# Patient Record
Sex: MALE | Race: WHITE | NOT HISPANIC OR LATINO | Employment: FULL TIME | URBAN - NONMETROPOLITAN AREA
[De-identification: names, ages, dates, MRNs, and addresses within clinical notes are randomized per-mention and may not be internally consistent; named-entity substitution may affect disease eponyms.]

---

## 2017-08-14 ENCOUNTER — ANESTHESIA EVENT (OUTPATIENT)
Dept: GASTROENTEROLOGY | Facility: HOSPITAL | Age: 52
End: 2017-08-14

## 2017-08-14 ENCOUNTER — HOSPITAL ENCOUNTER (OUTPATIENT)
Facility: HOSPITAL | Age: 52
Setting detail: HOSPITAL OUTPATIENT SURGERY
Discharge: HOME | End: 2017-08-14
Attending: INTERNAL MEDICINE | Admitting: INTERNAL MEDICINE

## 2017-08-14 ENCOUNTER — ANESTHESIA (OUTPATIENT)
Dept: GASTROENTEROLOGY | Facility: HOSPITAL | Age: 52
End: 2017-08-14

## 2017-08-14 VITALS
BODY MASS INDEX: 31.74 KG/M2 | HEIGHT: 68 IN | SYSTOLIC BLOOD PRESSURE: 129 MMHG | WEIGHT: 209.44 LBS | TEMPERATURE: 97.6 F | RESPIRATION RATE: 16 BRPM | OXYGEN SATURATION: 98 % | HEART RATE: 67 BPM | DIASTOLIC BLOOD PRESSURE: 59 MMHG

## 2017-08-14 DIAGNOSIS — Z12.11 SPECIAL SCREENING FOR MALIGNANT NEOPLASMS, COLON: ICD-10-CM

## 2017-08-14 PROCEDURE — 88305 TISSUE EXAM BY PATHOLOGIST: CPT | Performed by: PATHOLOGY

## 2017-08-14 PROCEDURE — 25010000002 PROPOFOL 10 MG/ML EMULSION: Performed by: NURSE ANESTHETIST, CERTIFIED REGISTERED

## 2017-08-14 PROCEDURE — 88305 TISSUE EXAM BY PATHOLOGIST: CPT | Performed by: INTERNAL MEDICINE

## 2017-08-14 RX ORDER — LIDOCAINE HYDROCHLORIDE 10 MG/ML
INJECTION, SOLUTION INFILTRATION; PERINEURAL AS NEEDED
Status: DISCONTINUED | OUTPATIENT
Start: 2017-08-14 | End: 2017-08-14 | Stop reason: SURG

## 2017-08-14 RX ORDER — PROPOFOL 10 MG/ML
VIAL (ML) INTRAVENOUS AS NEEDED
Status: DISCONTINUED | OUTPATIENT
Start: 2017-08-14 | End: 2017-08-14 | Stop reason: SURG

## 2017-08-14 RX ORDER — DEXTROSE AND SODIUM CHLORIDE 5; .45 G/100ML; G/100ML
20 INJECTION, SOLUTION INTRAVENOUS CONTINUOUS
Status: DISCONTINUED | OUTPATIENT
Start: 2017-08-14 | End: 2017-08-14 | Stop reason: HOSPADM

## 2017-08-14 RX ADMIN — DEXTROSE AND SODIUM CHLORIDE 20 ML/HR: 5; 450 INJECTION, SOLUTION INTRAVENOUS at 14:32

## 2017-08-14 RX ADMIN — LIDOCAINE HYDROCHLORIDE 50 MG: 10 INJECTION, SOLUTION INFILTRATION; PERINEURAL at 14:33

## 2017-08-14 RX ADMIN — PROPOFOL 70 MG: 10 INJECTION, EMULSION INTRAVENOUS at 14:33

## 2017-08-14 RX ADMIN — PROPOFOL 30 MG: 10 INJECTION, EMULSION INTRAVENOUS at 14:36

## 2017-08-14 NOTE — PLAN OF CARE
Problem: Patient Care Overview (Adult)  Goal: Plan of Care Review  Outcome: Ongoing (interventions implemented as appropriate)    08/14/17 1447   Coping/Psychosocial Response Interventions   Plan Of Care Reviewed With patient   Patient Care Overview   Progress no change   Outcome Evaluation   Outcome Summary/Follow up Plan vss         Problem: GI Endoscopy (Adult)  Goal: Signs and Symptoms of Listed Potential Problems Will be Absent or Manageable (GI Endoscopy)  Outcome: Ongoing (interventions implemented as appropriate)    08/14/17 1447   GI Endoscopy   Problems Assessed (GI Endoscopy) all   Problems Present (GI Endoscopy) none

## 2017-08-14 NOTE — H&P
Henry Kinsey DO,[facility]  Gastroenterology  Hepatology  Endoscopy  Board Certified in Internal Medicine and gastroenterology  [address], [address]  [place]. [address]  - [phone]   F - [phone]     GASTROENTEROLOGY HISTORY AND PHYSICAL  NOTE   HENRY KINSEY DO.         SUBJECTIVE:   8/14/2017    Name: Rivera Son  DOD: 1965        Chief Complaint:       Subjective : Screening examination for routine risk for colon cancer    Patient is 52 y.o. male presents with desire for elective colonoscopy.  No subjective or objective symptoms or family history of colon cancer.      ROS/HISTORY/ CURRENT MEDICATIONS/OBJECTIVE/VS/PE:   Review of Systems:   Review of Systems    History:     Past Medical History:   Diagnosis Date   • High cholesterol      Past Surgical History:   Procedure Laterality Date   • FOOT FUSION Right     2nd toe    • NASAL SEPTUM SURGERY  08/12/2013    Nasal septoplasty   • ORIF FINGER / THUMB FRACTURE Left      History reviewed. No pertinent family history.  Social History   Substance Use Topics   • Smoking status: Never Smoker   • Smokeless tobacco: Former User   • Alcohol use No     No prescriptions prior to admission.     Allergies:  Review of patient's allergies indicates no known allergies.    I have reviewed the patients medical history, surgical history and family history in the available medical record system.     Current Medications:     Current Facility-Administered Medications   Medication Dose Route Frequency Provider Last Rate Last Dose   • dextrose 5 % and sodium chloride 0.45 % infusion  20 mL/hr Intravenous Continuous Henry Kinsey DO           Objective     Physical Exam:        Physical Exam:  General Appearance:    Alert, cooperative, in no acute distress   Head:    Normocephalic, without obvious abnormality, atraumatic   Eyes:            Lids and lashes normal, conjunctivae and sclerae normal, no   icterus, no pallor, corneas clear, PERRLA    Ears:    Ears appear intact with no abnormalities noted   Throat:   No oral lesions, no thrush, oral mucosa moist   Neck:   No adenopathy, supple, trachea midline, no thyromegaly, no     carotid bruit, no JVD   Back:     No kyphosis present, no scoliosis present, no skin lesions,       erythema or scars, no tenderness to percussion or                   palpation,   range of motion normal   Lungs:     Clear to auscultation,respirations regular, even and                   unlabored    Heart:    Regular rhythm and normal rate, normal S1 and S2, no            murmur, no gallop, no rub, no click   Breast Exam:    Deferred   Abdomen:     Normal bowel sounds, no masses, no organomegaly, soft        non-tender, non-distended, no guarding, no rebound                 tenderness   Genitalia:    Deferred   Extremities:   Moves all extremities well, no edema, no cyanosis, no              redness   Pulses:   Pulses palpable and equal bilaterally   Skin:   No bleeding, bruising or rash   Lymph nodes:   No palpable adenopathy   Neurologic:   Cranial nerves 2 - 12 grossly intact, sensation intact, DTR        present and equal bilaterally      Results Review:     No results found for: WBC, HGB, HCT, PLT          No results found for: LIPASE  No results found for: INR       Radiology Review:  Imaging Results (last 72 hours)     ** No results found for the last 72 hours. **           I reviewed the patient's new clinical results.  I reviewed the patient's new imaging results and agree with the interpretation.     ASSESSMENT/PLAN:   ASSESSMENT:   1.  Screening examination for routine risk for colon cancer    PLAN:   1.  Colonoscopy, screening    Risk and benefits associated with the procedure are reviewed with the patient.  The patient wishes to proceed      Lewis Garcia DO  08/14/17  2:25 PM

## 2017-08-14 NOTE — PLAN OF CARE
Problem: Patient Care Overview (Adult)  Goal: Plan of Care Review    08/14/17 1501   Coping/Psychosocial Response Interventions   Plan Of Care Reviewed With patient   Patient Care Overview   Progress no change   Outcome Evaluation   Outcome Summary/Follow up Plan vss         Problem: GI Endoscopy (Adult)  Goal: Signs and Symptoms of Listed Potential Problems Will be Absent or Manageable (GI Endoscopy)    08/14/17 1501   GI Endoscopy   Problems Assessed (GI Endoscopy) all   Problems Present (GI Endoscopy) none

## 2017-08-14 NOTE — ANESTHESIA POSTPROCEDURE EVALUATION
Patient: Rivera Son    Procedure Summary     Date Anesthesia Start Anesthesia Stop Room / Location    08/14/17 1432 1446 [facility] ENDOSCOPY 2 / [facility] ENDOSCOPY       Procedure Diagnosis Surgeon Provider    COLONOSCOPY (N/A ) Special screening for malignant neoplasms, colon  (Special screening for malignant neoplasms, colon [Z12.11]) DO Rayo Alvarado CRNA          Anesthesia Type: MAC  Last vitals  BP   155/82 (08/14/17 1417)    Temp   97.3 °F (36.3 °C) (08/14/17 1417)    Pulse   60 (08/14/17 1417)   Resp   18 (08/14/17 1417)    SpO2   100 % (08/14/17 1417)      Post Anesthesia Care and Evaluation    Patient location during evaluation: bedside  Patient participation: complete - patient participated  Level of consciousness: awake and alert  Pain score: 0  Pain management: adequate  Airway patency: patent  Anesthetic complications: No anesthetic complications  PONV Status: none  Cardiovascular status: acceptable  Respiratory status: acceptable  Hydration status: acceptable

## 2017-08-14 NOTE — ANESTHESIA PREPROCEDURE EVALUATION
Anesthesia Evaluation     Patient summary reviewed and Nursing notes reviewed   NPO Solid Status: > 8 hours  NPO Liquid Status: > 2 hours     Airway   Mallampati: II  TM distance: >3 FB  Neck ROM: full  no difficulty expected  Dental - normal exam     Pulmonary - negative pulmonary ROS and normal exam   Cardiovascular     Rhythm: regular  Rate: normal        Neuro/Psych- negative ROS  GI/Hepatic/Renal/Endo - negative ROS     Musculoskeletal (-) negative ROS    Abdominal    Substance History - negative use     OB/GYN          Other - negative ROS                                       Anesthesia Plan    ASA 2     MAC     intravenous induction   Anesthetic plan and risks discussed with patient.    Plan discussed with CRNA.

## 2017-08-16 LAB
LAB AP CASE REPORT: NORMAL
Lab: NORMAL
PATH REPORT.FINAL DX SPEC: NORMAL
PATH REPORT.GROSS SPEC: NORMAL

## 2018-08-14 ENCOUNTER — CLINICAL SUPPORT (OUTPATIENT)
Dept: AUDIOLOGY | Facility: CLINIC | Age: 53
End: 2018-08-14

## 2018-08-14 DIAGNOSIS — Z46.1 ENCOUNTER FOR FITTING OR ADJUSTMENT OF HEARING AID: Primary | ICD-10-CM

## 2018-08-14 PROCEDURE — HEARINGNOCHG: Performed by: AUDIOLOGIST

## 2018-08-14 NOTE — PROGRESS NOTES
HEARING AID CHECK    Name:  Rivera Son  :  1965  Age:  53 y.o.  Date of Evaluation:  2018      HISTORY    Reason for visit:  Rivera Son is seen today for a hearing aid check.  Patient reports his left hearing aid is not working.    Hearing aid history:  Patient is currently wearing a  in the Canal (VERO) hearing aid in both ear(s).     OFFICE VISIT    During today's visit the left hearing aid was troubleshooted, and it was determined the external  was not working.  The external  was replaced and the hearing aid was in good working condition.  Ear domes were replaced on both hearing aids.  He purchased 1 package of wax traps at this time.     It was a pleasure seeing Rivera Son in Audiology today.  It is a pleasure helping Mr. Son with his amplification needs.             This document has been electronically signed by Renata Suggs MS CCC-JOY on 2018 1:43 PM       Renata Suggs MS CCC-JOY  Licensed Audiologist    For Billing and Codin  Hearing Aid Check, Binaural - no charge

## 2019-10-15 ENCOUNTER — CLINICAL SUPPORT (OUTPATIENT)
Dept: AUDIOLOGY | Facility: CLINIC | Age: 54
End: 2019-10-15

## 2019-10-15 DIAGNOSIS — H90.3 SENSORINEURAL HEARING LOSS, BILATERAL: Primary | ICD-10-CM

## 2019-10-15 PROCEDURE — 92567 TYMPANOMETRY: CPT | Performed by: AUDIOLOGIST

## 2019-10-15 PROCEDURE — 92557 COMPREHENSIVE HEARING TEST: CPT | Performed by: AUDIOLOGIST

## 2019-10-16 NOTE — PROGRESS NOTES
HEARING AID EVALUATION WITH AUDIOGRAM    Name:  Rivera Son  :  1965  Age:  54 y.o.  Date of Evaluation:  10/16/2019      HISTORY    Reason for visit:  Rivera Son is seen today for a hearing test and hearing aid evaluation at the request of himself .  Patient reports his insurance pays for his hearing aids every 3 years, and he is currently eligible for new hearing aids.  He states his hearing might be a little worse in his left ear.  He states he hears an occasional loud ringing in his left ear which has gotten worse in the last 1-2 years.  He states he does not always work in loud noise, but when he does he wears hearing protection.     He currently wears Resound VERO hearing aids, and he states the left hearing aid isn't working well, and the  doesn't fit in his ear well.    Hearing aid history:  Patient is currently wearing a  in the Canal (VERO) hearing aid in both ear(s).    EVALUATION    See Audiogram    RESULTS:    Otoscopy and Tympanometry 226 Hz :  Right Ear:  Otoscopy:  Clear ear canal          Tympanometry:  Middle ear function within normal limits    Left Ear:   Otoscopy:  Clear ear canal        Tympanometry:  Middle ear function within normal limits    Test technique:  Standard Audiometry     Pure Tone Audiometry:   Patient responded to pure tones at 10-65 dB for 250-8000 Hz in right ear, and at 10-75 dB for 250-8000 Hz in left ear.       Speech Audiometry:        Right Ear:  Speech Reception Threshold (SRT) was obtained at 5 dBHL                 Speech Discrimination scores were 100% in quiet when words were presented at 50 dBHL       Left Ear:  Speech Reception Threshold (SRT) was obtained at 15 dBHL                 Speech Discrimination scores were 100% in quiet when words were presented at 55 dBHL    Reliability:   good    IMPRESSIONS:  1.  Tympanometry results are consistent with Middle ear function within normal limits in both ears.  2.  Pure tone results  are consistent with within normal limits to moderately severe precipitous, high frequency sensorineural hearing loss for both ears.       RECOMMENDATIONS:  During the Hearing Aid discussion, Mr. Son has chosen 2  in the Canal (VERO) hearing aid(s) for both ears.  The hearing aid recommended is from [employer] with the VideoMiningo 7 62 digital circuit.  His insurance will pay up to $6,000.00 for hearing aids.  The cost of these hearing aids is $2,500.00 per aid for a total of $5,000.00 for the pair.  This amount will be billed to his insurance at the time of fitting.      In the meantime, his hearing aids were cleaned and checked.  The  was replaced on his left hearing aid using a 2 MP left  and medium open ear dome.  He reported the fit was good and the sound was better.  At the time, he declined purchasing the extended warranty on these hearing aids.  He was told that the hearing aids can still be sent off for repair out of warranty for $280 per repair.  He seemed to understand this.      Once his new hearing aids arrive, he will be contacted to make an appointment for his fitting.  It was a pleasure seeing Rivera Son in Audiology today.  I look forward to helping Mr. Son with his amplification needs.            This document has been electronically signed by Renata Suggs MS CCC-A on October 16, 2019 9:23 AM       Renata Suggs MS CCC-A  Licensed Audiologist

## 2019-11-11 ENCOUNTER — CLINICAL SUPPORT (OUTPATIENT)
Dept: AUDIOLOGY | Facility: CLINIC | Age: 54
End: 2019-11-11

## 2019-11-11 DIAGNOSIS — Z46.1 ENCOUNTER FOR FITTING OR ADJUSTMENT OF HEARING AID: Primary | ICD-10-CM

## 2019-11-11 PROCEDURE — V5261 HEARING AID, DIGIT, BIN, BTE: HCPCS | Performed by: AUDIOLOGIST

## 2019-11-11 NOTE — PROGRESS NOTES
HEARING AID FITTING    Name:  Rivera Son  :  1965  Age:  54 y.o.  Date of Evaluation:  2019      HISTORY    Reason for visit:  Rivera Son is seen today for a hearing aid fitting.  The hearing aids to be fit are  in the Canal (VERO) hearing aids from  [employer] with the Gigya 7 62 digital circuit.      Right Hearing Aid Serial Number: [id number]  Left Hearing Aid Serial Number: [id number]      Warranty Expiration:  's warranty extends through 2021 which covers repairs, loss and damage.    Battery Size:  13    The hearing aids were fit to Mr. Son's ears.  The hearing aids were fit using #2 MP receivers and medium open domes.  Patient reported the fit was comfortable and the sound was good.  Hearing aids were successfully paired to his iPhone.  Patient was instructed on use and care of the hearing instruments.  The necessary paperwork was signed.  All questions were answered at this time.  The cost of this hearing aid is $2,500.00 per aid for a total of $5,000.00.  Patient's insurance will be billed for the cost of the hearing aid(s).    Mr. Son will return in 1 week for a hearing aid follow up.  At that time we will make adjustments to the hearing aid(s) and address problems as necessary.      It was a pleasure seeing Rivera Son in Audiology today.  It is a pleasure helping Mr. Son with his amplification needs.             This document has been electronically signed by Renata Suggs MS CCC-A on 2019 1:39 PM        Renata Suggs MS CCC-A  Licensed Audiologist      For Billing and Coding:  Please bill the following to patient's [employer] insurance:    Hearing Aid, Digital Binaural BTE - $5,000.00

## 2019-11-20 ENCOUNTER — CLINICAL SUPPORT (OUTPATIENT)
Dept: AUDIOLOGY | Facility: CLINIC | Age: 54
End: 2019-11-20

## 2019-11-20 DIAGNOSIS — Z46.1 ENCOUNTER FOR FITTING OR ADJUSTMENT OF HEARING AID: Primary | ICD-10-CM

## 2019-11-20 PROCEDURE — HEARINGNOCHG: Performed by: AUDIOLOGIST

## 2019-11-20 NOTE — PROGRESS NOTES
HEARING AID CHECK    Name:  Rivera Son  :  1965  Age:  54 y.o.  Date of Evaluation:  2019      HISTORY    Reason for visit:  Rivera Son is seen today for a hearing aid check.  Patient reports he is doing well with his new [employer] Linx Quattro VERO hearing aids.  He reports he wears the hearing aids all day every day, he hears everything he needs to hear, and he is overall very satisfied with the hearing aids.    Hearing aid history:  Patient is currently wearing a  in the Canal (VERO) hearing aid in both ear(s).     OFFICE VISIT    During today's visit no adjustments were needed at this time.  He will return for hearing aid assistance as necessary.    It was a pleasure seeing Rivera Son in Audiology today.  It is a pleasure helping Mr. Son with his amplification needs.             This document has been electronically signed by Renata Suggs MS CCC-A on 2019 4:05 PM       Renata Suggs MS CCC-A  Licensed Audiologist    For Billing and Codin  Hearing Aid Check, Binaural - no charge

## 2020-03-26 PROCEDURE — U0003 INFECTIOUS AGENT DETECTION BY NUCLEIC ACID (DNA OR RNA); SEVERE ACUTE RESPIRATORY SYNDROME CORONAVIRUS 2 (SARS-COV-2) (CORONAVIRUS DISEASE [COVID-19]), AMPLIFIED PROBE TECHNIQUE, MAKING USE OF HIGH THROUGHPUT TECHNOLOGIES AS DESCRIBED BY CMS-2020-01-R: HCPCS | Performed by: NURSE PRACTITIONER

## 2020-03-26 PROCEDURE — 87635 SARS-COV-2 COVID-19 AMP PRB: CPT | Performed by: NURSE PRACTITIONER

## 2021-07-27 ENCOUNTER — CLINICAL SUPPORT (OUTPATIENT)
Dept: AUDIOLOGY | Facility: CLINIC | Age: 56
End: 2021-07-27

## 2021-07-27 DIAGNOSIS — Z46.1 ENCOUNTER FOR FITTING OR ADJUSTMENT OF HEARING AID: Primary | ICD-10-CM

## 2021-07-27 PROCEDURE — HEARINGNOCHG: Performed by: AUDIOLOGIST

## 2021-07-27 NOTE — PROGRESS NOTES
HEARING AID CHECK    Name:  Rivera Son  :  1965  Age:  56 y.o.  Date of Evaluation:  2021      HISTORY    Reason for visit:  Rivera Son is seen today for a hearing aid problem.  Patient reports his left hearing aid quit working.  He also reported any wind noise he hears is loud in his hearing aids.    Hearing aid history:  Patient is currently wearing a  in the Canal (VERO) hearing aid in both ears ear(s).     OFFICE VISIT    During today's visit troubleshooting revealed his  was not working.  Therefore, a new  (# 2 MP for the left ear) will be ordered.  Once the  arrives, the hearing aid can be fixed under warranty.  Then, the patient will be contacted to come  the aid, and there will be no charge.    The computer was used to decrease loudness of wind noise.  He will try this and let me know if he needs any more adjustments.     It was a pleasure seeing Rivera Son in Audiology today.  It is a pleasure helping Mr. Son with his amplification needs.             This document has been electronically signed by Renata Suggs MS CCC-A on 2021 10:44 CDT       Renata Suggs MS CCC-A  Licensed Audiologist    For Billing and Codin  Hearing Aid Check, Binaural - no charge

## 2021-11-10 ENCOUNTER — CLINICAL SUPPORT (OUTPATIENT)
Dept: AUDIOLOGY | Facility: CLINIC | Age: 56
End: 2021-11-10

## 2021-11-10 DIAGNOSIS — Z46.1 ENCOUNTER FOR FITTING OR ADJUSTMENT OF HEARING AID: Primary | ICD-10-CM

## 2021-11-10 PROCEDURE — HEARINGNOCHG: Performed by: AUDIOLOGIST

## 2021-11-12 NOTE — PROGRESS NOTES
HEARING AID CHECK    Name:  Rivera Son  :  1965  Age:  56 y.o.  Date of Evaluation:  2021      HISTORY    Reason for visit:  Rivera Son is seen today for a hearing aid warranty check.  Patient reports his hearing aids are doing fine.  He states he still has trouble with wind noise being too loud.  At this time, he mentioned his insurance will pay for new hearing aids every 2 years and asked if it has been 2 years since his last purchase.    Hearing aid history:  Patient is currently wearing a  in the Canal (VERO) hearing aid in both ears ear(s).     OFFICE VISIT    During today's visit computer was used to verify that wind noise setting was turned down as far as it will go.  No other adjustments were needed at this time.     It was determined that it has been 2 years since his last purchase of hearing aids.  His warranty options were discussed.  He chose not to purchase the extended warranty at this time.  Instead, he will get a referral for a new hearing test and to discuss new hearing aids.      It was a pleasure seeing Rivera Son in Audiology today.  It is a pleasure helping Mr. Son with his amplification needs.             This document has been electronically signed by Renata Suggs MS CCC-A on 2021 13:16 CST       Renata Suggs MS CCC-A  Licensed Audiologist    For Billing and Codin  Hearing Aid Check, Binaural - no charge

## 2022-01-26 ENCOUNTER — CLINICAL SUPPORT (OUTPATIENT)
Dept: AUDIOLOGY | Facility: CLINIC | Age: 57
End: 2022-01-26

## 2022-01-26 DIAGNOSIS — H90.3 SENSORINEURAL HEARING LOSS, BILATERAL: Primary | ICD-10-CM

## 2022-01-26 DIAGNOSIS — H69.83 EUSTACHIAN TUBE DYSFUNCTION, BILATERAL: ICD-10-CM

## 2022-01-26 PROCEDURE — 92567 TYMPANOMETRY: CPT | Performed by: AUDIOLOGIST

## 2022-01-26 PROCEDURE — 92557 COMPREHENSIVE HEARING TEST: CPT | Performed by: AUDIOLOGIST

## 2022-02-07 NOTE — PROGRESS NOTES
HEARING AID EVALUATION WITH AUDIOGRAM    Name:  Rivera Son  :  1965  Age:  56 y.o.  Date of Evaluation:  2022      HISTORY    Reason for visit:  Rivera Son is seen today for a hearing test and hearing aid evaluation at the request of FERCHO Palafox.  Patient reports he needs new hearing aids.  He states he has problems understanding In conversation.  He states he hears ringing in his left ear off and on for the past 1-2 years, and sometimes it is loud.  He states he currently has [employer] insurance which has hearing aid benefits.     Hearing aid history:  Patient is currently wearing a  in the Canal (VERO) hearing aid in both ears ear(s).    EVALUATION    See Audiogram    RESULTS:    Otoscopy and Tympanometry 226 Hz :  Right Ear:  Otoscopy:  Clear ear canal          Tympanometry:  Negative middle ear pressure    Left Ear:   Otoscopy:  Clear ear canal        Tympanometry:  Negative middle ear pressure    Test technique:  Standard Audiometry     Pure Tone Audiometry:   Patient responded to pure tones at 10-65 dB for 250-8000 Hz in right ear, and at 10-70 dB for 250-8000 Hz in left ear.       Speech Audiometry:        Right Ear:  Speech Reception Threshold (SRT) was obtained at 15 dBHL                 Speech Discrimination scores were 100% in quiet when words were presented at 55 dBHL       Left Ear:  Speech Reception Threshold (SRT) was obtained at 20 dBHL                 Speech Discrimination scores were 96% in quiet when words were presented at 60 dBHL    Reliability:   good    IMPRESSIONS:  1.  Tympanometry results are consistent with Negative middle ear pressure in both ears.  2.  Pure tone results are consistent with within normal limits to moderate precipitous, high frequency sensorineural hearing loss for both ears.       RECOMMENDATIONS:  Test results were reviewed with patient, and all questions were answered at this time.  During the Hearing Aid  discussion, Mr. Son has chosen 2  in the Canal (VERO) hearing aid(s) for both ears.  The hearing aid recommended is from [employer] with the Linx Quattro rechargeable digital circuit and #2 MP receivers.      The hearing aids will be ordered.  Once the hearing aids arrive, he will be contacted to make an appointment for his fitting.  His [employer] insurance will be billed at the time of the fitting.      It was a pleasure seeing Rivera Son in Audiology today.  I look forward to helping Mr. Son with his amplification needs.            This document has been electronically signed by Renata Suggs MS CCC-JOY on February 7, 2022 14:26 CST       Renata Suggs MS CCC-A  Licensed Audiologist

## 2022-02-17 ENCOUNTER — CLINICAL SUPPORT (OUTPATIENT)
Dept: AUDIOLOGY | Facility: CLINIC | Age: 57
End: 2022-02-17

## 2022-02-17 DIAGNOSIS — Z46.1 ENCOUNTER FOR FITTING OR ADJUSTMENT OF HEARING AID: Primary | ICD-10-CM

## 2022-02-17 PROCEDURE — V5261 HEARING AID, DIGIT, BIN, BTE: HCPCS | Performed by: AUDIOLOGIST

## 2022-02-21 NOTE — PROGRESS NOTES
HEARING AID FITTING    Name:  Rivera Son  :  1965  Age:  56 y.o.  Date of Evaluation:  2022      HISTORY    Reason for visit:  Rivera Son is seen today for a hearing aid fitting.  The hearing aids to be fit are  in the Canal (VERO) hearing aids from  [employer] with the One 7  DRCytoVale digital circuit.    Right Hearing Aid Serial Number: [id number]  Left Hearing Aid Serial Number: [id number]      Warranty Expiration:  's warranty extends through 2024 which covers repairs, loss and damage.    Battery Size:  rechargeable    The hearing aids were fit to Mr. Son's ears using #3 MP receivers and medium open domes.  Patient reported the fit was comfortable and the sound was good.  Patient was instructed on use and care of the hearing instruments.  The hearing aids were successfully paired with his iPhone.  The necessary paperwork was signed.  All questions were answered at this time.  The cost of this hearing aid is $3,000.00 per aid for a total of $6,000.00.  Patient's insurance will be billed for the cost of the hearing aid(s).    Mr. Son will return in 2 weeks for a hearing aid follow up.  At that time we will make adjustments to the hearing aid(s) and address problems as necessary.      It was a pleasure seeing Rivera Son in Audiology today.  It is a pleasure helping Mr. Son with his amplification needs.             This document has been electronically signed by Renata Suggs MS CCC-JOY on 2022 09:27 [facility]        Renata Suggs MS CCC-A  Licensed Audiologist      For Billing and Coding:  Please bill insurance for these charges:    Hearing Aid, Digital Binaural BTE - $6,000.00

## 2022-02-28 ENCOUNTER — CLINICAL SUPPORT (OUTPATIENT)
Dept: AUDIOLOGY | Facility: CLINIC | Age: 57
End: 2022-02-28

## 2022-02-28 DIAGNOSIS — Z46.1 ENCOUNTER FOR FITTING OR ADJUSTMENT OF HEARING AID: Primary | ICD-10-CM

## 2022-02-28 PROCEDURE — HEARINGNOCHG: Performed by: AUDIOLOGIST

## 2022-03-01 NOTE — PROGRESS NOTES
HEARING AID CHECK    Name:  Rivera Son  :  1965  Age:  56 y.o.  Date of Evaluation:  3/1/2022      HISTORY    Reason for visit:  Rivera Son is seen today for a hearing aid follow up.  Patient reports he is doing well with the new hearing aids. He states sounds are clear and volume is good.  The only trouble he reports is that wind noise is too loud, and he has to take them out.     Hearing aid history:  Patient is currently wearing a  in the Canal (VERO) hearing aid in both ears ear(s).     OFFICE VISIT    During today's visit computer was used to activate and enhance wind noise reduction.  He will try this adjustment and let me know if he needs any further assistance.     It was a pleasure seeing Rivera Son in Audiology today.  It is a pleasure helping Mr. Son with his amplification needs.             This document has been electronically signed by Renata Suggs MS CCC-A on 2022 09:17 [facility]       Renata Suggs MS CCC-A  Licensed Audiologist    For Billing and Codin  Hearing Aid Check, Binaural - no charge

## (undated) DEVICE — SINGLE-USE BIOPSY FORCEPS: Brand: RADIAL JAW 4

## (undated) DEVICE — CANN SMPL SOFTECH BIFLO ETCO2 A/M 7FT